# Patient Record
Sex: MALE | Race: WHITE | NOT HISPANIC OR LATINO | Employment: FULL TIME | URBAN - METROPOLITAN AREA
[De-identification: names, ages, dates, MRNs, and addresses within clinical notes are randomized per-mention and may not be internally consistent; named-entity substitution may affect disease eponyms.]

---

## 2021-10-06 ENCOUNTER — TRANSCRIBE ORDERS (OUTPATIENT)
Dept: PHYSICAL THERAPY | Facility: CLINIC | Age: 51
End: 2021-10-06

## 2021-10-06 DIAGNOSIS — S46.812A TRAPEZIUS STRAIN, LEFT, INITIAL ENCOUNTER: ICD-10-CM

## 2021-10-06 DIAGNOSIS — M25.512 LEFT SHOULDER PAIN, UNSPECIFIED CHRONICITY: Primary | ICD-10-CM

## 2021-10-07 ENCOUNTER — TREATMENT (OUTPATIENT)
Dept: PHYSICAL THERAPY | Facility: CLINIC | Age: 51
End: 2021-10-07

## 2021-10-07 DIAGNOSIS — S46.912D STRAIN OF LEFT SHOULDER, SUBSEQUENT ENCOUNTER: ICD-10-CM

## 2021-10-07 DIAGNOSIS — M25.512 LEFT SHOULDER PAIN, UNSPECIFIED CHRONICITY: Primary | ICD-10-CM

## 2021-10-07 PROCEDURE — 97110 THERAPEUTIC EXERCISES: CPT | Performed by: PHYSICAL THERAPIST

## 2021-10-07 PROCEDURE — 97530 THERAPEUTIC ACTIVITIES: CPT | Performed by: PHYSICAL THERAPIST

## 2021-10-07 PROCEDURE — 97161 PT EVAL LOW COMPLEX 20 MIN: CPT | Performed by: PHYSICAL THERAPIST

## 2021-10-07 PROCEDURE — 97035 APP MDLTY 1+ULTRASOUND EA 15: CPT | Performed by: PHYSICAL THERAPIST

## 2021-10-07 NOTE — PROGRESS NOTES
Physical Therapy Initial Evaluation and Plan of Care    Patient: Doug Church   : 1970  Diagnosis/ICD-10 Code:  Left shoulder pain, unspecified chronicity [M25.512]  Referring practitioner: MACARIO Christie  Date of Initial Evaluation:  Type: THERAPY  Noted: 10/7/2021  _______________________________________________________________________________________________________________________    Subjective Evaluation    History of Present Illness  Date of onset: 2021  Mechanism of injury: No specific injury but was working in a different dept requiring repetitive OH work; no prior L shdr or neck issues; denies N/T but reports frequent popping; other med HX HTN      Patient Occupation: warehouse work   Precautions and Work Restrictions: 20 lbsPain  Current pain ratin  At best pain ratin  At worst pain ratin  Location: left [place] area  Quality: dull ache (sharp with turn head for driving)  Relieving factors: medications and rest ([facility])  Aggravating factors: repetitive movement, overhead activity and outstretched reach  Progression: no change    Hand dominance: right    Diagnostic Tests  No diagnostic tests performed    Treatments  Previous treatment: medication  Current treatment: medication and physical therapy  Patient Goals  Patient goals for therapy: decreased pain and return to work             Objective          Postural Observations  Seated posture: fair  Correction of posture: has no consistent effect    Additional Postural Observation Details  FHP    Palpation   Left   Hypertonic in the upper trapezius.     Additional Palpation Details  Tender deep inside SS fossa; Tender inf angle scap; no TTP GHJ    Cervical/Thoracic Screen   Cervical range of motion within normal limits with the following exceptions: Limited but WFL and pull pain with FF and RSB    Active Range of Motion   Left Shoulder   Flexion: 146 degrees with pain  Abduction: 134 degrees with pain  External rotation BTH: T2    Internal rotation BTB: T10     Passive Range of Motion   Left Shoulder   Normal passive range of motion    Strength/Myotome Testing   Cervical Spine     Left   Levator scapulae (C4): 5    Left Shoulder     Planes of Motion   Flexion: 5   Abduction: 5   External rotation at 0°: 5   Internal rotation at 0°: 5     Isolated Muscles   Levator scapulae: 5   Middle trapezius: 4+   Serratus anterior: 4+   Upper trapezius: 5     Right Shoulder     Isolated Muscles   Middle trapezius: 4+   Serratus anterior: 5     Tests     Left Shoulder   Positive empty can and Hawkin's.   Negative clunk and Speed's.     Additional Tests Details  Lower scap pain also with EC; some laxity noted with Sulcus B; pain both IR and ER with O'Briens              Assessment & Plan     Assessment  Impairments: abnormal or restricted ROM, impaired physical strength and pain with function  Assessment details: 50 yo M with recent onset pain from repetitive OH work on new job task presents with signs/sxs consistent with RC strain, shoulder impingement without obvious tear.  Also with poor posturing and scap-thor weakness as contributing factors.  Prognosis: good  Functional Limitations: uncomfortable because of pain, reaching behind back, reaching overhead and unable to perform repetitive tasks  Goals  Plan Goals: STGs x 2 wks  1. ROM WFL with pain < 3/10  2. Postural Strength increasing for improved ADLs and dec impingement  3. Review body mechanics with ADLs  4. Demos alma delia for and compliance with HEP    LTGs x 4 wks  1. ROM restored to pre-injury levels   2. Strength per MMT > 4+/5  3. Negative impingement and instability testing  4. Demonstrates tolerance for repetitive material handling OH at up to 5 lbs to allow RTW w/o restriction    Plan  Therapy options: will be seen for skilled physical therapy services  Planned modality interventions: ultrasound  Planned therapy interventions: postural training, body mechanics training, manual therapy,  therapeutic activities, stretching, strengthening, home exercise program, functional ROM exercises and neuromuscular re-education  Frequency: 3x week  Duration in weeks: 4  Treatment plan discussed with: patient          Manual Therapy:    0     mins  92406;  Therapeutic Exercise:    14     mins  06329;     Neuromuscular Gordo:    0    mins  59557;    Therapeutic Activity:     9     mins  28248;     Gait Trainin     mins  76299;     Ultrasound:     9     mins  32995;    Work Hardening           0      mins 27134  Iontophoresis               0   mins 79835    Timed Treatment:   32   mins   Total Treatment:     52   mins    PT SIGNATURE: Zina Levin PT           Initial Certification  Certification Period: 10/7/2021 thru 2022  I certify that the therapy services are furnished while this patient is under my care.  The services outlined above are required by this patient, and will be reviewed every 90 days.     PHYSICIAN: Alba Urias, MACARIO      DATE:     Please sign and return via fax to [phone].. Thank you, [facility].

## 2021-10-12 ENCOUNTER — TREATMENT (OUTPATIENT)
Dept: PHYSICAL THERAPY | Facility: CLINIC | Age: 51
End: 2021-10-12

## 2021-10-12 DIAGNOSIS — M25.512 LEFT SHOULDER PAIN, UNSPECIFIED CHRONICITY: Primary | ICD-10-CM

## 2021-10-12 DIAGNOSIS — S46.912D STRAIN OF LEFT SHOULDER, SUBSEQUENT ENCOUNTER: ICD-10-CM

## 2021-10-12 PROCEDURE — 97035 APP MDLTY 1+ULTRASOUND EA 15: CPT | Performed by: PHYSICAL THERAPIST

## 2021-10-12 PROCEDURE — 97110 THERAPEUTIC EXERCISES: CPT | Performed by: PHYSICAL THERAPIST

## 2021-10-12 NOTE — PROGRESS NOTES
Physical Therapy Daily Progress Note      Visit # 2      Subjective   It's feeling better and moving better.  Less popping.    Objective   See Exercise, Manual, and Modality Logs for complete treatment.       Assessment/Plan    Responding favorably to Rx so far with dec pain and popping.  Tolerated moderate progression of ex with only mild c/o pain/discomfort.    Progress per POC as alma delia             Manual Therapy:    0     mins  67556;  Therapeutic Exercise:    39     mins  71922;        Ultrasound:     9     mins  48682;        Timed Treatment:   48   mins   Total Treatment:     48   mins    Zina Levin PT  Physical Therapist  [place] License #[id number]

## 2021-10-14 ENCOUNTER — TREATMENT (OUTPATIENT)
Dept: PHYSICAL THERAPY | Facility: CLINIC | Age: 51
End: 2021-10-14

## 2021-10-14 DIAGNOSIS — M25.512 LEFT SHOULDER PAIN, UNSPECIFIED CHRONICITY: Primary | ICD-10-CM

## 2021-10-14 DIAGNOSIS — S46.912D STRAIN OF LEFT SHOULDER, SUBSEQUENT ENCOUNTER: ICD-10-CM

## 2021-10-14 PROCEDURE — 97035 APP MDLTY 1+ULTRASOUND EA 15: CPT | Performed by: PHYSICAL THERAPIST

## 2021-10-14 PROCEDURE — 97110 THERAPEUTIC EXERCISES: CPT | Performed by: PHYSICAL THERAPIST

## 2021-10-14 NOTE — PROGRESS NOTES
Physical Therapy Daily Progress Note      Visit # 3      Subjective    Moving better and feels better but popping some today    Objective   See Exercise, Manual, and Modality Logs for complete treatment.       Assessment/Plan    Improved overall but still with intermittent popping and pain. ~ Full AROM without pain in clinic.  Still some mm fatigue with exercises but able to progress lightly.    Continue with focus on strength/stability           Manual Therapy:    0     mins  49381;  Therapeutic Exercise:    39     mins  22713;     Neuromuscular Gordo:    0    mins  25799;    Therapeutic Activity:     0     mins  30439;     Gait Trainin     mins  89481;     Ultrasound:     9     mins  87050;    Work Hardening           0      mins 20780  Iontophoresis               0   mins 43795  Estim   0 min 35505      Timed Treatment:   48   mins   Total Treatment:     48   mins    Zina Levin PT  Physical Therapist  [place] License #[id number]

## 2021-10-15 ENCOUNTER — TREATMENT (OUTPATIENT)
Dept: PHYSICAL THERAPY | Facility: CLINIC | Age: 51
End: 2021-10-15

## 2021-10-15 DIAGNOSIS — S46.912D STRAIN OF LEFT SHOULDER, SUBSEQUENT ENCOUNTER: ICD-10-CM

## 2021-10-15 DIAGNOSIS — M25.512 LEFT SHOULDER PAIN, UNSPECIFIED CHRONICITY: Primary | ICD-10-CM

## 2021-10-15 PROCEDURE — 97530 THERAPEUTIC ACTIVITIES: CPT | Performed by: PHYSICAL THERAPIST

## 2021-10-15 PROCEDURE — 97035 APP MDLTY 1+ULTRASOUND EA 15: CPT | Performed by: PHYSICAL THERAPIST

## 2021-10-15 PROCEDURE — 97110 THERAPEUTIC EXERCISES: CPT | Performed by: PHYSICAL THERAPIST

## 2021-10-15 NOTE — PROGRESS NOTES
Physical Therapy Daily Progress Note      Visit # 4      Subjective   Feeling fine    Objective   See Exercise, Manual, and Modality Logs for complete treatment.       Assessment/Plan    Good overall progress with dec c/o pain and popping and alma delia for progression of ex.  Needed moderate instruction though on proper body mechanics.    If no notable signs/sxs and demonstrates proper body mech- reassess and return to MD next visit             Manual Therapy:    0     mins  65059;  Therapeutic Exercise:    30     mins  29448;     Neuromuscular Gordo:    5    mins  44111;    Therapeutic Activity:     10     mins  55851;     Gait Trainin     mins  19184;     Ultrasound:     9     mins  88731;        Timed Treatment:   54   mins   Total Treatment:     54   mins    Zina Levin PT  Physical Therapist  [place] License #[id number]

## 2021-10-18 ENCOUNTER — TREATMENT (OUTPATIENT)
Dept: PHYSICAL THERAPY | Facility: CLINIC | Age: 51
End: 2021-10-18

## 2021-10-18 DIAGNOSIS — S46.912D STRAIN OF LEFT SHOULDER, SUBSEQUENT ENCOUNTER: ICD-10-CM

## 2021-10-18 DIAGNOSIS — M25.512 LEFT SHOULDER PAIN, UNSPECIFIED CHRONICITY: Primary | ICD-10-CM

## 2021-10-18 PROCEDURE — 97530 THERAPEUTIC ACTIVITIES: CPT | Performed by: PHYSICAL THERAPIST

## 2021-10-18 PROCEDURE — 97110 THERAPEUTIC EXERCISES: CPT | Performed by: PHYSICAL THERAPIST

## 2021-10-18 NOTE — PROGRESS NOTES
Physical Therapy  Progress Note        10/18/2021  Alba Urias, APRN    Re: Doug Church  ________________________________________________________________    Mr. Doug Church, has attended 5 PT sessions.  Treatment has consisted of: there-ex/act, HEP, modalities and pt ed     S: Mr. Doug Church states: just feels like a mild bruise now.  Pain 0-1/10.  My movement is back and OK with lifting and reaching        Subjective     Objective          Postural Observations  Seated posture: fair  Correction of posture: has no consistent effect    Additional Postural Observation Details  FHP    Palpation   Left   Hypertonic in the upper trapezius.     Additional Palpation Details  Tender deep inside SS fossa; no TTP GHJ    Cervical/Thoracic Screen   Cervical range of motion within normal limits with the following exceptions: Limited but WFL and without pain    Active Range of Motion     Additional Active Range of Motion Details  All ~ WNL and = without pain    Passive Range of Motion   Left Shoulder   Normal passive range of motion    Strength/Myotome Testing   Cervical Spine     Left   Levator scapulae (C4): 5    Left Shoulder     Planes of Motion   Flexion: 5   Abduction: 5   External rotation at 0°: 5   Internal rotation at 0°: 5     Isolated Muscles   Levator scapulae: 5   Middle trapezius: 4+   Serratus anterior: 5   Upper trapezius: 5     Right Shoulder     Isolated Muscles   Middle trapezius: 4+   Serratus anterior: 5     Tests   Cervical     Left   Negative active compression ([place]).     Left Shoulder   Negative Hawkin's.     Additional Tests Details  Slight pain with EC      See Exercise, Manual, and Modality Logs for complete treatment.       Assessment/Plan    Good overall improvement with only mild signs/sxs of RC strain remaining.  Is eager to RTW and demonstrates understanding of proper body mechanics now and independence with HEP.    Recommend D/C if you agree             Manual Therapy:    0     mins   68489;  Therapeutic Exercise:    25     mins  77305;     Neuromuscular Gordo:    0    mins  55468;    Therapeutic Activity:     13     mins  49810;         Timed Treatment:   38   mins   Total Treatment:     38   mins    Zina Levin PT  Physical Therapist